# Patient Record
Sex: MALE | Race: BLACK OR AFRICAN AMERICAN | NOT HISPANIC OR LATINO | Employment: FULL TIME | URBAN - METROPOLITAN AREA
[De-identification: names, ages, dates, MRNs, and addresses within clinical notes are randomized per-mention and may not be internally consistent; named-entity substitution may affect disease eponyms.]

---

## 2023-11-08 ENCOUNTER — APPOINTMENT (OUTPATIENT)
Dept: RADIOLOGY | Facility: HOSPITAL | Age: 53
End: 2023-11-08
Payer: COMMERCIAL

## 2023-11-08 ENCOUNTER — HOSPITAL ENCOUNTER (EMERGENCY)
Facility: HOSPITAL | Age: 53
Discharge: HOME | End: 2023-11-08
Attending: STUDENT IN AN ORGANIZED HEALTH CARE EDUCATION/TRAINING PROGRAM
Payer: COMMERCIAL

## 2023-11-08 VITALS
HEART RATE: 75 BPM | RESPIRATION RATE: 18 BRPM | OXYGEN SATURATION: 97 % | DIASTOLIC BLOOD PRESSURE: 80 MMHG | WEIGHT: 230 LBS | SYSTOLIC BLOOD PRESSURE: 120 MMHG | BODY MASS INDEX: 29.52 KG/M2 | TEMPERATURE: 97.9 F | HEIGHT: 74 IN

## 2023-11-08 DIAGNOSIS — R07.9 CHEST PAIN, UNSPECIFIED TYPE: Primary | ICD-10-CM

## 2023-11-08 LAB
ALBUMIN SERPL BCP-MCNC: 4.2 G/DL (ref 3.4–5)
ALP SERPL-CCNC: 35 U/L (ref 33–120)
ALT SERPL W P-5'-P-CCNC: 20 U/L (ref 10–52)
ANION GAP SERPL CALC-SCNC: 8 MMOL/L (ref 10–20)
AST SERPL W P-5'-P-CCNC: 18 U/L (ref 9–39)
BASOPHILS # BLD AUTO: 0.02 X10*3/UL (ref 0–0.1)
BASOPHILS NFR BLD AUTO: 0.7 %
BILIRUB SERPL-MCNC: 0.7 MG/DL (ref 0–1.2)
BUN SERPL-MCNC: 15 MG/DL (ref 6–23)
CALCIUM SERPL-MCNC: 9.4 MG/DL (ref 8.6–10.3)
CARDIAC TROPONIN I PNL SERPL HS: 3 NG/L (ref 0–20)
CARDIAC TROPONIN I PNL SERPL HS: 3 NG/L (ref 0–20)
CHLORIDE SERPL-SCNC: 104 MMOL/L (ref 98–107)
CO2 SERPL-SCNC: 33 MMOL/L (ref 21–32)
CREAT SERPL-MCNC: 0.97 MG/DL (ref 0.5–1.3)
EOSINOPHIL # BLD AUTO: 0.06 X10*3/UL (ref 0–0.7)
EOSINOPHIL NFR BLD AUTO: 2 %
ERYTHROCYTE [DISTWIDTH] IN BLOOD BY AUTOMATED COUNT: 13.1 % (ref 11.5–14.5)
GFR SERPL CREATININE-BSD FRML MDRD: >90 ML/MIN/1.73M*2
GLUCOSE SERPL-MCNC: 110 MG/DL (ref 74–99)
HCT VFR BLD AUTO: 37.3 % (ref 41–52)
HGB BLD-MCNC: 12.6 G/DL (ref 13.5–17.5)
IMM GRANULOCYTES # BLD AUTO: 0.01 X10*3/UL (ref 0–0.7)
IMM GRANULOCYTES NFR BLD AUTO: 0.3 % (ref 0–0.9)
LYMPHOCYTES # BLD AUTO: 0.78 X10*3/UL (ref 1.2–4.8)
LYMPHOCYTES NFR BLD AUTO: 26.2 %
MAGNESIUM SERPL-MCNC: 1.99 MG/DL (ref 1.6–2.4)
MCH RBC QN AUTO: 27.1 PG (ref 26–34)
MCHC RBC AUTO-ENTMCNC: 33.8 G/DL (ref 32–36)
MCV RBC AUTO: 80 FL (ref 80–100)
MONOCYTES # BLD AUTO: 0.18 X10*3/UL (ref 0.1–1)
MONOCYTES NFR BLD AUTO: 6 %
NEUTROPHILS # BLD AUTO: 1.93 X10*3/UL (ref 1.2–7.7)
NEUTROPHILS NFR BLD AUTO: 64.8 %
NRBC BLD-RTO: 0 /100 WBCS (ref 0–0)
PLATELET # BLD AUTO: 140 X10*3/UL (ref 150–450)
POTASSIUM SERPL-SCNC: 3.9 MMOL/L (ref 3.5–5.3)
PROT SERPL-MCNC: 6.3 G/DL (ref 6.4–8.2)
RBC # BLD AUTO: 4.65 X10*6/UL (ref 4.5–5.9)
SODIUM SERPL-SCNC: 141 MMOL/L (ref 136–145)
WBC # BLD AUTO: 3 X10*3/UL (ref 4.4–11.3)

## 2023-11-08 PROCEDURE — 85025 COMPLETE CBC W/AUTO DIFF WBC: CPT

## 2023-11-08 PROCEDURE — 71045 X-RAY EXAM CHEST 1 VIEW: CPT | Mod: FY

## 2023-11-08 PROCEDURE — 84484 ASSAY OF TROPONIN QUANT: CPT

## 2023-11-08 PROCEDURE — 93010 ELECTROCARDIOGRAM REPORT: CPT | Performed by: STUDENT IN AN ORGANIZED HEALTH CARE EDUCATION/TRAINING PROGRAM

## 2023-11-08 PROCEDURE — 99285 EMERGENCY DEPT VISIT HI MDM: CPT | Performed by: STUDENT IN AN ORGANIZED HEALTH CARE EDUCATION/TRAINING PROGRAM

## 2023-11-08 PROCEDURE — 36415 COLL VENOUS BLD VENIPUNCTURE: CPT

## 2023-11-08 PROCEDURE — 99285 EMERGENCY DEPT VISIT HI MDM: CPT | Mod: 25 | Performed by: STUDENT IN AN ORGANIZED HEALTH CARE EDUCATION/TRAINING PROGRAM

## 2023-11-08 PROCEDURE — 83735 ASSAY OF MAGNESIUM: CPT

## 2023-11-08 PROCEDURE — 80053 COMPREHEN METABOLIC PANEL: CPT

## 2023-11-08 PROCEDURE — 71045 X-RAY EXAM CHEST 1 VIEW: CPT | Performed by: RADIOLOGY

## 2023-11-08 PROCEDURE — 99283 EMERGENCY DEPT VISIT LOW MDM: CPT | Mod: 25

## 2023-11-08 ASSESSMENT — PAIN DESCRIPTION - LOCATION: LOCATION: CHEST

## 2023-11-08 ASSESSMENT — HEART SCORE
AGE: 45-64
RISK FACTORS: 1-2 RISK FACTORS
HEART SCORE: 2
TROPONIN: LESS THAN OR EQUAL TO NORMAL LIMIT
ECG: NORMAL
HISTORY: SLIGHTLY SUSPICIOUS

## 2023-11-08 ASSESSMENT — COLUMBIA-SUICIDE SEVERITY RATING SCALE - C-SSRS
2. HAVE YOU ACTUALLY HAD ANY THOUGHTS OF KILLING YOURSELF?: NO
6. HAVE YOU EVER DONE ANYTHING, STARTED TO DO ANYTHING, OR PREPARED TO DO ANYTHING TO END YOUR LIFE?: NO
1. IN THE PAST MONTH, HAVE YOU WISHED YOU WERE DEAD OR WISHED YOU COULD GO TO SLEEP AND NOT WAKE UP?: NO

## 2023-11-08 ASSESSMENT — LIFESTYLE VARIABLES
EVER HAD A DRINK FIRST THING IN THE MORNING TO STEADY YOUR NERVES TO GET RID OF A HANGOVER: NO
HAVE YOU EVER FELT YOU SHOULD CUT DOWN ON YOUR DRINKING: NO
REASON UNABLE TO ASSESS: NO
EVER FELT BAD OR GUILTY ABOUT YOUR DRINKING: NO
HAVE PEOPLE ANNOYED YOU BY CRITICIZING YOUR DRINKING: NO

## 2023-11-08 ASSESSMENT — PAIN SCALES - GENERAL: PAINLEVEL_OUTOF10: 2

## 2023-11-08 ASSESSMENT — PAIN - FUNCTIONAL ASSESSMENT: PAIN_FUNCTIONAL_ASSESSMENT: 0-10

## 2023-11-08 NOTE — DISCHARGE INSTRUCTIONS
Please follow up with your primary care physician within 1-2 days.  Please call the cardiologist for an appointment.  If you have worsening chest pain, difficulty breathing, or other concerning symptoms, please seek immediate medical attention and come back into the ER.    If you have a return or worsening of symptoms, please seek immediate medical attention.

## 2023-11-08 NOTE — ED PROVIDER NOTES
EMERGENCY DEPARTMENT ENCOUNTER      Pt Name: Ankush Bauer  MRN: [id number]  Birthdate 1970  Date of evaluation: 11/8/2023  Provider: Cony Clemens DO    CHIEF COMPLAINT       Chief Complaint   Patient presents with    Chest Pain     Patient states chest pain started approx 2 am         HISTORY OF PRESENT ILLNESS    Patient is a 53-year-old male with a past medical history of prostate cancer who presents to the ED with chief complaint of chest pain.  Patient reports that he has had the pain for few years in the past but that it came on this time around 2 AM this morning.  Describes as a spasm-light pain that is localized under his left breast.  States that does not radiate anywhere.  Pain is usually episodic and occurs every 15 minutes for about 2-3 seconds.  Mostly occurs at rest.  He had an argument with his son yesterday before the pain started. He notes when he had the pain in the past, it was during his divorce. Rates the pain a 2/10 on the pain scale.  Patient has tried aspirin but does not seem to get any relief.  Denies any associated symptoms.  No headaches, vision changes, fevers, chills, shortness of breath, abdominal pain, nausea, vomiting, diarrhea or swelling in his legs and ankles.          Nursing Notes were reviewed.    PAST MEDICAL HISTORY     Past Medical History:   Diagnosis Date    Encounter for immunization 02/12/2021    Encounter for immunization    Encounter for immunization     Encounter for immunization    Encounter for screening for malignant neoplasm of colon 05/13/2021    Colon cancer screening    Encounter for screening for malignant neoplasm of prostate 11/30/2020    Screening for prostate cancer    Personal history of other diseases of the respiratory system 07/28/2015    History of hay fever    Personal history of other specified conditions 01/14/2021    History of elevated prostate specific antigen (PSA)         SURGICAL HISTORY       Past Surgical History:   Procedure  Laterality Date    OTHER SURGICAL HISTORY  12/21/2020    No history of surgery         CURRENT MEDICATIONS       There are no discharge medications for this patient.      ALLERGIES     Patient has no known allergies.    FAMILY HISTORY     No family history on file.       SOCIAL HISTORY       Social History     Socioeconomic History    Marital status:      Spouse name: None    Number of children: None    Years of education: None    Highest education level: None   Occupational History    None   Tobacco Use    Smoking status: None    Smokeless tobacco: None   Substance and Sexual Activity    Alcohol use: None    Drug use: None    Sexual activity: None   Other Topics Concern    None   Social History Narrative    None     Social Determinants of Health     Financial Resource Strain: Not on file   Food Insecurity: Not on file   Transportation Needs: Not on file   Physical Activity: Not on file   Stress: Not on file   Social Connections: Not on file   Intimate Partner Violence: Not on file   Housing Stability: Not on file       SCREENINGS                        PHYSICAL EXAM    (up to 7 for level 4, 8 or more for level 5)     ED Triage Vitals [11/08/23 0904]   Temp Heart Rate Resp BP   36.8 °C (98.2 °F) 79 18 146/72      SpO2 Temp Source Heart Rate Source Patient Position   98 % Temporal Monitor Sitting      BP Location FiO2 (%)     Right arm --       REVIEW OF SYSTEMS:    CONSTITUTIONAL: Denies fever, sweats, chills.   NEURO: Denies headache.   HEENT: Denies changes in vision.   CARDIO: Reports chest pain Denies palpitations.  PULM: Denies shortness of breath, cough.   GI: Denies abdominal pain, nausea, vomiting        Physical Exam  Vitals and nursing note reviewed.   Constitutional:       General: He is not in acute distress.     Appearance: He is well-developed. He is not ill-appearing, toxic-appearing or diaphoretic.   HENT:      Head: Normocephalic.   Cardiovascular:      Rate and Rhythm: Normal rate and  regular rhythm.      Pulses:           Carotid pulses are 2+ on the right side and 2+ on the left side.       Radial pulses are 2+ on the right side and 2+ on the left side.        Dorsalis pedis pulses are 2+ on the right side and 2+ on the left side.        Posterior tibial pulses are 2+ on the right side and 2+ on the left side.      Heart sounds: Normal heart sounds. No murmur heard.     No gallop.   Pulmonary:      Effort: Pulmonary effort is normal.      Breath sounds: Normal breath sounds. No wheezing, rhonchi or rales.   Chest:      Chest wall: No tenderness.   Abdominal:      General: Bowel sounds are normal.      Palpations: Abdomen is soft.      Tenderness: There is no abdominal tenderness. There is no guarding or rebound.   Musculoskeletal:      Cervical back: Normal range of motion.      Right lower leg: No edema.      Left lower leg: No edema.   Skin:     General: Skin is warm and dry.      Capillary Refill: Capillary refill takes less than 2 seconds.   Neurological:      General: No focal deficit present.      Mental Status: He is alert and oriented to person, place, and time.   Psychiatric:         Mood and Affect: Mood normal.         Behavior: Behavior normal.          DIAGNOSTIC RESULTS     LABS:  Labs Reviewed   CBC WITH AUTO DIFFERENTIAL - Abnormal       Result Value    WBC 3.0 (*)     nRBC 0.0      RBC 4.65      Hemoglobin 12.6 (*)     Hematocrit 37.3 (*)     MCV 80      MCH 27.1      MCHC 33.8      RDW 13.1      Platelets 140 (*)     Neutrophils % 64.8      Immature Granulocytes %, Automated 0.3      Lymphocytes % 26.2      Monocytes % 6.0      Eosinophils % 2.0      Basophils % 0.7      Neutrophils Absolute 1.93      Immature Granulocytes Absolute, Automated 0.01      Lymphocytes Absolute 0.78 (*)     Monocytes Absolute 0.18      Eosinophils Absolute 0.06      Basophils Absolute 0.02     COMPREHENSIVE METABOLIC PANEL - Abnormal    Glucose 110 (*)     Sodium 141      Potassium 3.9       Chloride 104      Bicarbonate 33 (*)     Anion Gap 8 (*)     Urea Nitrogen 15      Creatinine 0.97      eGFR >90      Calcium 9.4      Albumin 4.2      Alkaline Phosphatase 35      Total Protein 6.3 (*)     AST 18      Bilirubin, Total 0.7      ALT 20     MAGNESIUM - Normal    Magnesium 1.99     SERIAL TROPONIN-INITIAL - Normal    Troponin I, High Sensitivity 3      Narrative:     Less than 99th percentile of normal range cutoff-  Female and children under 18 years old <14 ng/L; Male <21 ng/L: Negative  Repeat testing should be performed if clinically indicated.     Female and children under 18 years old 14-50 ng/L; Male 21-50 ng/L:  Consistent with possible cardiac damage and possible increased clinical   risk. Serial measurements may help to assess extent of myocardial damage.     >50 ng/L: Consistent with cardiac damage, increased clinical risk and  myocardial infarction. Serial measurements may help assess extent of   myocardial damage.      NOTE: Children less than 1 year old may have higher baseline troponin   levels and results should be interpreted in conjunction with the overall   clinical context.     NOTE: Troponin I testing is performed using a different   testing methodology at [facility] than at other   [facility]. Direct result comparisons should only   be made within the same method.   SERIAL TROPONIN, 1 HOUR - Normal    Troponin I, High Sensitivity 3      Narrative:     Less than 99th percentile of normal range cutoff-  Female and children under 18 years old <14 ng/L; Male <21 ng/L: Negative  Repeat testing should be performed if clinically indicated.     Female and children under 18 years old 14-50 ng/L; Male 21-50 ng/L:  Consistent with possible cardiac damage and possible increased clinical   risk. Serial measurements may help to assess extent of myocardial damage.     >50 ng/L: Consistent with cardiac damage, increased clinical risk and  myocardial infarction. Serial  measurements may help assess extent of   myocardial damage.      NOTE: Children less than 1 year old may have higher baseline troponin   levels and results should be interpreted in conjunction with the overall   clinical context.     NOTE: Troponin I testing is performed using a different   testing methodology at [facility] than at other   [facility]. Direct result comparisons should only   be made within the same method.   TROPONIN SERIES- (INITIAL, 1 HR)    Narrative:     The following orders were created for panel order Troponin I Series, High Sensitivity (0, 1 HR).  Procedure                               Abnormality         Status                     ---------                               -----------         ------                     Troponin I, High Sensiti...[[id number]]  Normal              Final result               Troponin, High Sensitivi...[[id number]]  Normal              Final result                 Please view results for these tests on the individual orders.       All other labs were within normal range or not returned as of this dictation.    Imaging  XR chest 1 view   Final Result   No active cardiopulmonary disease.                  MACRO:   None        Signed by: Priya Willard 11/8/2023 10:47 AM   Dictation workstation:   [id number]           Procedures  Procedures     EMERGENCY DEPARTMENT COURSE/MDM:     ED Course as of 11/08/23 2256   Wed Nov 08, 2023   0913 EKG independently interpreted by attending physician    0901 hrs.: Normal sinus rhythm ventricular to 73 bpm.  QTc 394.  .  Left axis deviation.  No acute injury pattern seen. [AI]   1156 EKG independently interpreted by attending physician    1123 hrs.: Normal sinus rhythm ventricular to 70 bpm.  QTc 395.  .  No acute injury pattern seen. [AI]   1244 KG independently interpreted by attending physician    1223 hrs.: Normal sinus rhythm ventricular rate of 68 bpm.  QTc 397.  .  No acute injury  pattern seen. [AI]      ED Course User Index  [AI] Cony Clemens DO         Diagnoses as of 11/08/23 2256   Chest pain, unspecified type        Medical Decision Making  Patient is a 53-year-old male with a past medical history of prostate cancer who presents to the ED with chief complaint of chest pain. Heart score of 2. We still proceeded with a cardiac work-up.      Nontoxic-appearing male, no acute distress.  No abnormalities noted on patient's labs or imaging.  EKG showed no acute ischemic changes.    Patient did that this chest pain episode exacerbated by an argument with his son.  States he had previous chest pains like this while going through a divorce years ago.     Patient was discharged with instruction to follow up with cardiology and PCP in 1-2 days. Patient instructed to return to the ED if his symptoms worsen or he experiences difficulty breathing or other concerning symptoms.       Patient in agreement and understanding of treatment plan.  All questions answered.      I reviewed the case with the attending ED physician. The attending ED physician agrees with the plan. Patient and/or patient´s representative was counseled regarding labs, imaging, likely diagnosis, and plan. All questions were answered.       Blas Sheridan MD  Resident  11/08/23 1309    ED Medications administered this visit:  Medications - No data to display    New Prescriptions from this visit:    There are no discharge medications for this patient.      Follow-up:  Dc Mcclain DO  [address]  [facility]  [facility] [id number] [phone]          Ezequiel Umana MD  [address]  [facility] [address]  [phone]              Final Impression:   1. Chest pain, unspecified type          (Please note that portions of this note were completed with a voice recognition program.  Efforts were made to edit the dictations but occasionally words are  mis-transcribed.)  ----------------------  Attending note    53-year-old male presents with complaint of chest pain.  States that he began to have intermittent chest pain underneath his left breast that started around 0200 hrs. this morning.  He notes that he did have an argument with his son prior.  Denies any fevers, chills, night sweats, shortness of breath, abdominal pain, nausea/vomiting.  He states that he has had chest pain like this before in the past when he was going through his divorce.  At that time, years ago, he had a stress test that he states was negative.  Has not had a cardiac catheterization.  Does not follow with a cardiologist.  States that he took 2 aspirin and normally takes 1.  States that pain is a 2/10 on pain scale.    Nontoxic-appearing male, no acute distress.  Cardiac work-up initiated.  No acute injury pattern on EKG.  Did offer the patient 2 more of aspirin to make it a full dose however he notes that he would like to take his aspirin at home and feels comfortable without it at this time.  Did have slight leukopenia however in reviewing EMR, he has had this in the past. Troponins are negative x 2. No acute injury pattern on EKGs. Patient will be discharged home to follow up with PCP and cardiology. Patient hemodynamically stable. Updated about results. All questions and concerns addressed. Patient discharged in stable condition.        The patient was seen by the resident/fellow.  I have personally performed a substantive portion of the encounter.  I have seen and examined the patient; agree with the workup, evaluation, MDM, management and diagnosis.  The care plan has been discussed with the resident; I have reviewed the resident’s note and agree with the documented findings.           Cony Clemens,   11/08/23 7192

## 2023-11-09 ENCOUNTER — HOSPITAL ENCOUNTER (OUTPATIENT)
Dept: CARDIOLOGY | Facility: HOSPITAL | Age: 53
Discharge: HOME | End: 2023-11-09
Payer: COMMERCIAL

## 2023-11-09 LAB
ATRIAL RATE: 68 BPM
ATRIAL RATE: 70 BPM
ATRIAL RATE: 73 BPM
P AXIS: 42 DEGREES
P AXIS: 52 DEGREES
P AXIS: 72 DEGREES
P OFFSET: 170 MS
P OFFSET: 172 MS
P OFFSET: 173 MS
P ONSET: 108 MS
P ONSET: 110 MS
P ONSET: 116 MS
PR INTERVAL: 180 MS
PR INTERVAL: 190 MS
PR INTERVAL: 192 MS
Q ONSET: 204 MS
Q ONSET: 205 MS
Q ONSET: 206 MS
QRS COUNT: 11 BEATS
QRS COUNT: 12 BEATS
QRS COUNT: 12 BEATS
QRS DURATION: 120 MS
QT INTERVAL: 358 MS
QT INTERVAL: 366 MS
QT INTERVAL: 374 MS
QTC CALCULATION(BAZETT): 394 MS
QTC CALCULATION(BAZETT): 395 MS
QTC CALCULATION(BAZETT): 397 MS
QTC FREDERICIA: 382 MS
QTC FREDERICIA: 385 MS
QTC FREDERICIA: 390 MS
R AXIS: -28 DEGREES
R AXIS: -34 DEGREES
R AXIS: -46 DEGREES
T AXIS: 47 DEGREES
T AXIS: 50 DEGREES
T AXIS: 71 DEGREES
T OFFSET: 385 MS
T OFFSET: 388 MS
T OFFSET: 391 MS
VENTRICULAR RATE: 68 BPM
VENTRICULAR RATE: 70 BPM
VENTRICULAR RATE: 73 BPM

## 2023-11-09 PROCEDURE — 93005 ELECTROCARDIOGRAM TRACING: CPT

## 2024-12-02 ENCOUNTER — TELEPHONE (OUTPATIENT)
Dept: UROLOGY | Facility: CLINIC | Age: 54
End: 2024-12-02
Payer: COMMERCIAL

## 2024-12-03 ENCOUNTER — LAB (OUTPATIENT)
Dept: LAB | Facility: LAB | Age: 54
End: 2024-12-03
Payer: COMMERCIAL

## 2024-12-03 DIAGNOSIS — R31.9 HEMATURIA, UNSPECIFIED TYPE: ICD-10-CM

## 2024-12-03 DIAGNOSIS — C61 MALIGNANT NEOPLASM OF PROSTATE (MULTI): ICD-10-CM

## 2024-12-03 LAB — PSA SERPL-MCNC: 11.04 NG/ML

## 2024-12-03 PROCEDURE — 36415 COLL VENOUS BLD VENIPUNCTURE: CPT

## 2024-12-03 PROCEDURE — 82565 ASSAY OF CREATININE: CPT

## 2024-12-03 PROCEDURE — 84153 ASSAY OF PSA TOTAL: CPT

## 2024-12-04 ENCOUNTER — APPOINTMENT (OUTPATIENT)
Dept: UROLOGY | Facility: CLINIC | Age: 54
End: 2024-12-04
Payer: COMMERCIAL

## 2024-12-04 VITALS
SYSTOLIC BLOOD PRESSURE: 147 MMHG | TEMPERATURE: 97.4 F | HEART RATE: 71 BPM | DIASTOLIC BLOOD PRESSURE: 82 MMHG | WEIGHT: 253 LBS | BODY MASS INDEX: 32.48 KG/M2

## 2024-12-04 DIAGNOSIS — R31.9 HEMATURIA, UNSPECIFIED TYPE: ICD-10-CM

## 2024-12-04 DIAGNOSIS — C61 MALIGNANT NEOPLASM OF PROSTATE (MULTI): ICD-10-CM

## 2024-12-04 LAB
CREAT SERPL-MCNC: 1.11 MG/DL (ref 0.5–1.3)
EGFRCR SERPLBLD CKD-EPI 2021: 79 ML/MIN/1.73M*2

## 2024-12-04 PROCEDURE — G2211 COMPLEX E/M VISIT ADD ON: HCPCS | Performed by: UROLOGY

## 2024-12-04 PROCEDURE — 99214 OFFICE O/P EST MOD 30 MIN: CPT | Performed by: UROLOGY

## 2024-12-04 ASSESSMENT — ENCOUNTER SYMPTOMS
LOSS OF SENSATION IN FEET: 0
DEPRESSION: 0
OCCASIONAL FEELINGS OF UNSTEADINESS: 0

## 2024-12-04 ASSESSMENT — PATIENT HEALTH QUESTIONNAIRE - PHQ9
2. FEELING DOWN, DEPRESSED OR HOPELESS: NOT AT ALL
SUM OF ALL RESPONSES TO PHQ9 QUESTIONS 1 AND 2: 0
1. LITTLE INTEREST OR PLEASURE IN DOING THINGS: NOT AT ALL

## 2024-12-04 NOTE — PROGRESS NOTES
Subjective   Patient ID: Ankush Bauer is a 54 y.o. male who presents for Follow-up (1 year; prostate cancer). Last seen 6/15/23 when Patient will remain on active surveillance. He will have PSAs on 3-month intervals and we will plan to repeat a biopsy next year. We refilled his sildenafil prescription today. He was instructed on proper dosing and potential side effects.      HPI  The patient relates that his flow and frequency is normal but he has an occasional feeling of being restricted. Nocturia x1. About 1 month ago he had some hematuria that last a few hours. It resolved spontaneously. He did not follow-up in the ER or otherwise.     PSA Results  Component  Ref Range & Units 1 d ago  (12/3/24) 3 yr ago  (7/6/21) 3 yr ago  (12/8/20) 5 yr ago  (3/14/19)   Prostate Specific AG  <=4.00 ng/mL 11.04 High  5.20 High  R, CM 5.7 High  R, CM 3.9 R, CM     Review of Systems  A 12 system review was completed and is negative with the exception of those signs and symptoms noted in the history of present illness.    Objective   Physical Exam  General: in NAD, appears stated age  Head: normocephalic, atraumatic  Respiratory: normal effort, no use of accessory muscles  Cardiovascular: no edema noted  Skin: normal turgor, no rashes  Neurologic: grossly intact, oriented to person/place/time  Psychiatric: mode and affect appropriate     Assessment/Plan   Problem List Items Addressed This Visit    None  Visit Diagnoses         Codes    Malignant neoplasm of prostate (Multi)     C61    Relevant Orders    Prostate Specific Antigen (Completed)    MR prostate with mirta boundaries if pirads 3 or above    Hematuria, unspecified type     R31.9    Relevant Orders    Creatinine    CT urography w 3D volume rendered imaging          PSA is up to 11.04. His prior PSA in our records was 5.20 in 7/2021. We discussed the [facility] work-up protocol for hematuria. Order MRI prostate and CT Urogram.  We will wait to schedule cystoscopy as the patient  may require a biopsy and we will do the cystoscopy at that same time. Follow-up with imaging results.    Scribe Attestation  By signing my name below, I, Kai Bowman   attest that this documentation has been prepared under the direction and in the presence of Casey Schneider MD.

## 2025-01-08 ENCOUNTER — HOSPITAL ENCOUNTER (OUTPATIENT)
Dept: RADIOLOGY | Facility: CLINIC | Age: 55
Discharge: HOME | End: 2025-01-08
Payer: COMMERCIAL

## 2025-01-08 DIAGNOSIS — R31.9 HEMATURIA, UNSPECIFIED TYPE: ICD-10-CM

## 2025-01-08 PROCEDURE — 76377 3D RENDER W/INTRP POSTPROCES: CPT | Performed by: RADIOLOGY

## 2025-01-08 PROCEDURE — 2550000001 HC RX 255 CONTRASTS: Performed by: UROLOGY

## 2025-01-08 PROCEDURE — 74178 CT ABD&PLV WO CNTR FLWD CNTR: CPT | Performed by: RADIOLOGY

## 2025-01-08 PROCEDURE — 74178 CT ABD&PLV WO CNTR FLWD CNTR: CPT

## 2025-01-08 RX ADMIN — IOHEXOL 90 ML: 350 INJECTION, SOLUTION INTRAVENOUS at 12:13

## 2025-01-09 ENCOUNTER — HOSPITAL ENCOUNTER (OUTPATIENT)
Dept: RADIOLOGY | Facility: CLINIC | Age: 55
Discharge: HOME | End: 2025-01-09
Payer: COMMERCIAL

## 2025-01-09 DIAGNOSIS — C61 MALIGNANT NEOPLASM OF PROSTATE (MULTI): ICD-10-CM

## 2025-01-09 PROCEDURE — A9575 INJ GADOTERATE MEGLUMI 0.1ML: HCPCS | Performed by: UROLOGY

## 2025-01-09 PROCEDURE — 72197 MRI PELVIS W/O & W/DYE: CPT

## 2025-01-09 PROCEDURE — 76498 UNLISTED MR PROCEDURE: CPT

## 2025-01-09 PROCEDURE — 2550000001 HC RX 255 CONTRASTS: Performed by: UROLOGY

## 2025-01-09 RX ORDER — GADOTERATE MEGLUMINE 376.9 MG/ML
20 INJECTION INTRAVENOUS
Status: COMPLETED | OUTPATIENT
Start: 2025-01-09 | End: 2025-01-09

## 2025-01-09 RX ADMIN — GADOTERATE MEGLUMINE 20 ML: 376.9 INJECTION INTRAVENOUS at 11:57

## 2025-01-15 RX ORDER — LIDOCAINE HYDROCHLORIDE 20 MG/ML
1 JELLY TOPICAL ONCE
Status: COMPLETED | OUTPATIENT
Start: 2025-01-16 | End: 2025-01-16

## 2025-01-16 ENCOUNTER — PROCEDURE VISIT (OUTPATIENT)
Dept: UROLOGY | Facility: CLINIC | Age: 55
End: 2025-01-16
Payer: COMMERCIAL

## 2025-01-16 ENCOUNTER — HOSPITAL ENCOUNTER (OUTPATIENT)
Facility: HOSPITAL | Age: 55
Setting detail: OUTPATIENT SURGERY
End: 2025-01-16
Attending: UROLOGY | Admitting: UROLOGY
Payer: COMMERCIAL

## 2025-01-16 VITALS — DIASTOLIC BLOOD PRESSURE: 96 MMHG | SYSTOLIC BLOOD PRESSURE: 145 MMHG | HEART RATE: 98 BPM | TEMPERATURE: 97.4 F

## 2025-01-16 DIAGNOSIS — R31.9 HEMATURIA, UNSPECIFIED TYPE: Primary | ICD-10-CM

## 2025-01-16 DIAGNOSIS — C61 PROSTATE CANCER (MULTI): ICD-10-CM

## 2025-01-16 LAB
POC APPEARANCE, URINE: CLEAR
POC BILIRUBIN, URINE: NEGATIVE
POC BLOOD, URINE: ABNORMAL
POC COLOR, URINE: YELLOW
POC GLUCOSE, URINE: NEGATIVE MG/DL
POC KETONES, URINE: NEGATIVE MG/DL
POC LEUKOCYTES, URINE: NEGATIVE
POC NITRITE,URINE: NEGATIVE
POC PH, URINE: 5.5 PH
POC PROTEIN, URINE: NEGATIVE MG/DL
POC SPECIFIC GRAVITY, URINE: 1.02
POC UROBILINOGEN, URINE: 1 EU/DL

## 2025-01-16 PROCEDURE — 52000 CYSTOURETHROSCOPY: CPT | Performed by: UROLOGY

## 2025-01-16 PROCEDURE — 81003 URINALYSIS AUTO W/O SCOPE: CPT | Performed by: UROLOGY

## 2025-01-16 PROCEDURE — 99214 OFFICE O/P EST MOD 30 MIN: CPT | Performed by: UROLOGY

## 2025-01-16 RX ADMIN — LIDOCAINE HYDROCHLORIDE 1 APPLICATION: 20 JELLY TOPICAL at 08:19

## 2025-01-16 ASSESSMENT — ENCOUNTER SYMPTOMS
OCCASIONAL FEELINGS OF UNSTEADINESS: 0
LOSS OF SENSATION IN FEET: 0
DEPRESSION: 0

## 2025-01-16 ASSESSMENT — PATIENT HEALTH QUESTIONNAIRE - PHQ9
SUM OF ALL RESPONSES TO PHQ9 QUESTIONS 1 AND 2: 0
1. LITTLE INTEREST OR PLEASURE IN DOING THINGS: NOT AT ALL
2. FEELING DOWN, DEPRESSED OR HOPELESS: NOT AT ALL

## 2025-01-16 NOTE — PROGRESS NOTES
"Subjective   Patient ID: Ankush Bauer is a 54 y.o. male who presents for cystoscopy. Last seen 12/4/24 when PSA is up to 11.04. His prior PSA in our records was 5.20 in 7/2021. [facility] for [facility]. Order MRI prostate and CT Urogram.  We will wait to schedule cystoscopy as the patient may require a biopsy and we will do the cystoscopy at that same time. Follow-up with imaging results.     HPI  The patient states he is an irregular smoker who would smoke a lot then quit repeatedly, up until 10 years ago.     The patient states hematuria started 1 day before he went for the CT scan and it was \"beet red\" and there were pieces of tissue discharged also. Later he passed a larger piece of tissue or clot.     MRI Prostate Results  PROSTATE VOLUME:  The prostate measures 6.6 x 5.9 x 7.3 cm in right-to-left,  anterior-posterior and craniocaudal dimension.  Prostate weight is estimated at 148.84g. PSA density is 0.07 ng/mL/g.  IMPRESSION:  1. A PI-RADS 5 lesion in the left posterior paramedian peripheral  zone at the midgland. Broad abutment without macroscopic  extracapsular extension.  2. Interval development of an intraluminal diffusion restricting  lesion/collection in the right aspect of the bladder which  demonstrates non enhancement. Findings may be secondary to a  hemorrhagic collection however an underlying neoplasm can not be  excluded. Recommend cystoscopy for further evaluation.    CT Urogram results  IMPRESSION:  PROSTATOMEGALY      NO OTHER CONTRIBUTORY ABNORMALITIES IN THE URINARY TRACT AND NONE AT  ALL IN THE UPPER TRACT, WITHIN THE LIMITATIONS OF THIS EXAM      NO STONE ANYWHERE IN THE URINARY TRACT      NO HYDROURETERONEPHROSIS ON EITHER SIDE      NO EVIDENCE OF ACUTE INFLAMMATION ANYWHERE IN THE URINARY TRACT      NO SOLID RENAL PARENCHYMAL MASS      NO EVIDENCE OF UROTHELIAL LESION IN THE OPACIFIED URINARY TRACT,  NOTING THAT THE FOLLOWING WAS / WERE NOT WELL ENOUGH " OPACIFIED WITH  EXCRETED CONTRAST DURING THE EXCRETORY PHASE OF TODAY'S EXAM TO  EVALUATE DIRECTLY: THE NONDEPENDENT TWO THIRDS OF THE URINARY  BLADDER; THE DISTAL 2/3 OF THE RIGHT URETER; MOST OF THE DISTAL 2/3  OF THE LEFT URETER.  OF THE PORTIONS OF THE URETERS NOT WELL ENOUGH  OPACIFIED TO EVALUATE DIRECTLY, NONE WAS PARTICULARLY EXPANSILE TO  SUGGEST AND UNDERLYING SUBSTANTIAL SIZED OCCULT UROTHELIAL LESION      NO VARIANT ANATOMY SUCH AS URACHAL REMNANT OR DUPLICATED/ECTOPIC  URETERS      NO ACUTE UNANTICIPATED PROCESS IN THE ABDOMEN OR PELVIS OUTSIDE THE  URINARY TRACT        Review of Systems  A 12 system review was completed and is negative with the exception of those signs and symptoms noted in the history of present illness.    Objective   Physical Exam  General: in NAD, appears stated age  Head: normocephalic, atraumatic  Respiratory: normal effort, no use of accessory muscles  Cardiovascular: no edema noted  Skin: normal turgor, no rashes  Neurologic: grossly intact, oriented to person/place/time  Psychiatric: mode and affect appropriate     Procedure  Cystoscopy for hematuria  Patient's genitalia were prepped and draped in the usual sterile fashion. A 17 [place] flexible cystoscope was passed atraumatically per the urethra. The anterior and posterior urethra were normal. The prostatic urethra was unremarkable. The bladder was inspected. Findings of enlarged prostate and bladder trabeculation, no tumors. Urine was blood tinged so visualization was sub optimal. No tumors, clots, stones, or foreign bodies were identified. The right and left ureteral orifice were in their normal anatomic position and were effluxing clear urine. The scope was retroflexed and the bladder neck was unremarkable. The cystoscope was removed and the patient tolerated the procedure well.     Assessment/Plan     The patient tolerated the cystoscopy procedure well. Cystoscopy finds enlargement of the prostate and bladder  trabeculation, no tumors. Urine was blood tinged so visualization was sub optimal.     We discussed the MRI results. PSA is going up and the lesion is growing. There is also a finding of something in the bladder. Prostate weight is estimated at 148.84 g PI-RADS 5 lesion. PI-RADS 5 lesions are cancer about 90% of the time. I explained the biopsy procedure to the patient. I also explained what to expect after the procedure. We will schedule cystoscopy and fusion biopsy at [facility] on January 26 and follow-up with pathology results.     Scribe Attestation  By signing my name below, I, Caridad Live , Kai   attest that this documentation has been prepared under the direction and in the presence of Casey Schneider MD.

## 2025-01-31 ENCOUNTER — APPOINTMENT (OUTPATIENT)
Dept: PRIMARY CARE | Facility: CLINIC | Age: 55
End: 2025-01-31
Payer: COMMERCIAL

## 2025-01-31 VITALS
RESPIRATION RATE: 16 BRPM | TEMPERATURE: 97.9 F | BODY MASS INDEX: 31.06 KG/M2 | HEIGHT: 74 IN | SYSTOLIC BLOOD PRESSURE: 132 MMHG | OXYGEN SATURATION: 96 % | WEIGHT: 242 LBS | HEART RATE: 76 BPM | DIASTOLIC BLOOD PRESSURE: 82 MMHG

## 2025-01-31 DIAGNOSIS — R00.2 PALPITATIONS: ICD-10-CM

## 2025-01-31 DIAGNOSIS — Z00.00 HEALTHCARE MAINTENANCE: Primary | ICD-10-CM

## 2025-01-31 PROCEDURE — 99386 PREV VISIT NEW AGE 40-64: CPT | Performed by: FAMILY MEDICINE

## 2025-01-31 PROCEDURE — 1036F TOBACCO NON-USER: CPT | Performed by: FAMILY MEDICINE

## 2025-01-31 PROCEDURE — 3008F BODY MASS INDEX DOCD: CPT | Performed by: FAMILY MEDICINE

## 2025-01-31 RX ORDER — BISMUTH SUBSALICYLATE 262 MG
1 TABLET,CHEWABLE ORAL DAILY
COMMUNITY

## 2025-01-31 SDOH — ECONOMIC STABILITY: INCOME INSECURITY: IN THE LAST 12 MONTHS, WAS THERE A TIME WHEN YOU WERE NOT ABLE TO PAY THE MORTGAGE OR RENT ON TIME?: NO

## 2025-01-31 SDOH — ECONOMIC STABILITY: FOOD INSECURITY: WITHIN THE PAST 12 MONTHS, THE FOOD YOU BOUGHT JUST DIDN'T LAST AND YOU DIDN'T HAVE MONEY TO GET MORE.: NEVER TRUE

## 2025-01-31 SDOH — ECONOMIC STABILITY: TRANSPORTATION INSECURITY
IN THE PAST 12 MONTHS, HAS THE LACK OF TRANSPORTATION KEPT YOU FROM MEDICAL APPOINTMENTS OR FROM GETTING MEDICATIONS?: NO

## 2025-01-31 SDOH — ECONOMIC STABILITY: FOOD INSECURITY: WITHIN THE PAST 12 MONTHS, YOU WORRIED THAT YOUR FOOD WOULD RUN OUT BEFORE YOU GOT MONEY TO BUY MORE.: NEVER TRUE

## 2025-01-31 SDOH — HEALTH STABILITY: PHYSICAL HEALTH: ON AVERAGE, HOW MANY MINUTES DO YOU ENGAGE IN EXERCISE AT THIS LEVEL?: 40 MIN

## 2025-01-31 SDOH — ECONOMIC STABILITY: TRANSPORTATION INSECURITY
IN THE PAST 12 MONTHS, HAS LACK OF TRANSPORTATION KEPT YOU FROM MEETINGS, WORK, OR FROM GETTING THINGS NEEDED FOR DAILY LIVING?: NO

## 2025-01-31 SDOH — HEALTH STABILITY: PHYSICAL HEALTH: ON AVERAGE, HOW MANY DAYS PER WEEK DO YOU ENGAGE IN MODERATE TO STRENUOUS EXERCISE (LIKE A BRISK WALK)?: 2 DAYS

## 2025-01-31 ASSESSMENT — ENCOUNTER SYMPTOMS
PALPITATIONS: 1
VOMITING: 0
COUGH: 0
ENDOCRINE NEGATIVE: 1
WHEEZING: 0
WEAKNESS: 0
EYES NEGATIVE: 1
ALLERGIC/IMMUNOLOGIC NEGATIVE: 1
PSYCHIATRIC NEGATIVE: 1
NUMBNESS: 0
DIZZINESS: 0
ABDOMINAL PAIN: 0
CONSTIPATION: 0
HEMATOLOGIC/LYMPHATIC NEGATIVE: 1
MUSCULOSKELETAL NEGATIVE: 1
FEVER: 0
HEADACHES: 0
SHORTNESS OF BREATH: 0
DIARRHEA: 0

## 2025-01-31 ASSESSMENT — SOCIAL DETERMINANTS OF HEALTH (SDOH)
IN THE PAST 12 MONTHS, HAS THE ELECTRIC, GAS, OIL, OR WATER COMPANY THREATENED TO SHUT OFF SERVICE IN YOUR HOME?: NO
WITHIN THE LAST YEAR, HAVE YOU BEEN HUMILIATED OR EMOTIONALLY ABUSED IN OTHER WAYS BY YOUR PARTNER OR EX-PARTNER?: NO
HOW OFTEN DO YOU ATTENT MEETINGS OF THE CLUB OR ORGANIZATION YOU BELONG TO?: 1 TO 4 TIMES PER YEAR
HOW OFTEN DO YOU ATTEND CHURCH OR RELIGIOUS SERVICES?: NEVER
WITHIN THE LAST YEAR, HAVE YOU BEEN KICKED, HIT, SLAPPED, OR OTHERWISE PHYSICALLY HURT BY YOUR PARTNER OR EX-PARTNER?: NO
WITHIN THE LAST YEAR, HAVE YOU BEEN AFRAID OF YOUR PARTNER OR EX-PARTNER?: NO
HOW HARD IS IT FOR YOU TO PAY FOR THE VERY BASICS LIKE FOOD, HOUSING, MEDICAL CARE, AND HEATING?: NOT HARD AT ALL
IN A TYPICAL WEEK, HOW MANY TIMES DO YOU TALK ON THE PHONE WITH FAMILY, FRIENDS, OR NEIGHBORS?: MORE THAN THREE TIMES A WEEK
HOW OFTEN DO YOU GET TOGETHER WITH FRIENDS OR RELATIVES?: ONCE A WEEK
DO YOU BELONG TO ANY CLUBS OR ORGANIZATIONS SUCH AS CHURCH GROUPS UNIONS, FRATERNAL OR ATHLETIC GROUPS, OR SCHOOL GROUPS?: YES
WITHIN THE LAST YEAR, HAVE TO BEEN RAPED OR FORCED TO HAVE ANY KIND OF SEXUAL ACTIVITY BY YOUR PARTNER OR EX-PARTNER?: NO

## 2025-01-31 ASSESSMENT — LIFESTYLE VARIABLES
HOW OFTEN DO YOU HAVE A DRINK CONTAINING ALCOHOL: NEVER
HOW OFTEN DO YOU HAVE SIX OR MORE DRINKS ON ONE OCCASION: NEVER
HOW MANY STANDARD DRINKS CONTAINING ALCOHOL DO YOU HAVE ON A TYPICAL DAY: PATIENT DOES NOT DRINK
SKIP TO QUESTIONS 9-10: 1
AUDIT-C TOTAL SCORE: 0

## 2025-01-31 ASSESSMENT — PATIENT HEALTH QUESTIONNAIRE - PHQ9
2. FEELING DOWN, DEPRESSED OR HOPELESS: NOT AT ALL
SUM OF ALL RESPONSES TO PHQ9 QUESTIONS 1 & 2: 0
1. LITTLE INTEREST OR PLEASURE IN DOING THINGS: NOT AT ALL

## 2025-01-31 NOTE — PROGRESS NOTES
"Subjective        Ankush Bauer is a 54 y.o. male who presents for Annual Exam.  Patient states a few times over the last few months he found little short episode of some chest discomfort.  Patient thinks it was more in the muscles.  He did not have any other symptoms no radiation, no jaw pain, no dyspnea, patient states occasionally feels like he has palpitations.  He does not get any other symptoms with labs no dizziness, patient has no shortness of breath, normal bowel bladder.  He is seeing urology for his prostate cancer.  Patient states he has had a history of swelling in his lower extremities.  This will improve with compression stockings in the morning.  This also has been going on for some time.  Patient has no orthopnea, no increased pillow use to help him sleep at night.  No recent illness, no night sweats, no bruising,        Review of Systems   Constitutional:  Negative for fever.   HENT: Negative.     Eyes: Negative.    Respiratory:  Negative for cough, shortness of breath and wheezing.    Cardiovascular:  Positive for palpitations and leg swelling. Negative for chest pain.   Gastrointestinal:  Negative for abdominal pain, constipation, diarrhea and vomiting.   Endocrine: Negative.    Genitourinary: Negative.    Musculoskeletal: Negative.    Skin:  Negative for rash.   Allergic/Immunologic: Negative.    Neurological:  Negative for dizziness, weakness, numbness and headaches.   Hematological: Negative.    Psychiatric/Behavioral: Negative.         Objective     /82 (BP Location: Right arm, Patient Position: Sitting, BP Cuff Size: Large adult)   Pulse 76   Temp 36.6 °C (97.9 °F)   Resp 16   Ht 1.886 m (6' 2.25\")   Wt 110 kg (242 lb)   SpO2 96%   BMI 30.86 kg/m²             Physical Exam  Vitals and nursing note reviewed.   Constitutional:       General: He is not in acute distress.     Appearance: Normal appearance. He is not ill-appearing.   HENT:      Head: Normocephalic.      Right Ear: " Tympanic membrane and ear canal normal.      Left Ear: Tympanic membrane and ear canal normal.      Nose: Nose normal.      Mouth/Throat:      Mouth: Mucous membranes are moist.      Pharynx: Oropharynx is clear.   Eyes:      Extraocular Movements: Extraocular movements intact.      Conjunctiva/sclera: Conjunctivae normal.      Pupils: Pupils are equal, round, and reactive to light.   Cardiovascular:      Rate and Rhythm: Normal rate and regular rhythm.      Pulses: Normal pulses.   Pulmonary:      Effort: Pulmonary effort is normal. No respiratory distress.      Breath sounds: No wheezing, rhonchi or rales.   Abdominal:      General: Bowel sounds are normal.      Palpations: Abdomen is soft.      Tenderness: There is no guarding.      Hernia: No hernia is present.   Musculoskeletal:         General: Swelling (1+ swelling bilateral lower extremities.  There are some vascular changes in the feet.  Full range of motion neurovascular intact.) present. Normal range of motion.      Cervical back: Neck supple.      Right lower leg: No edema.      Left lower leg: No edema.   Skin:     General: Skin is warm.      Capillary Refill: Capillary refill takes less than 2 seconds.   Neurological:      General: No focal deficit present.      Mental Status: He is oriented to person, place, and time.      Cranial Nerves: No cranial nerve deficit.      Sensory: No sensory deficit.      Gait: Gait normal.      Deep Tendon Reflexes: Reflexes normal.   Psychiatric:         Mood and Affect: Mood normal.         Behavior: Behavior normal.         Judgment: Judgment normal.         Assessment/Plan   Problem List Items Addressed This Visit    None  Visit Diagnoses       Healthcare maintenance    -  Primary    Relevant Orders    CBC    Comprehensive Metabolic Panel    Lipid Panel    Colonoscopy Screening; Average Risk Patient    CT cardiac scoring wo IV contrast    TSH with reflex to Free T4 if abnormal    Palpitations        Relevant Orders     CT cardiac scoring wo IV contrast    TSH with reflex to Free T4 if abnormal    Referral to Cardiology          Patient will continue to follow-up with urology.  His EKG did show normal sinus rhythm.  Possible right bundle branch block.  Patient is going to see cardiology, get a CT calcium score.  May need an echocardiogram, will get blood work.  Depending on his workup may need to see vascular surgery to check for incompetent veins in the lower extremities  Patient aware if any chest pain shortness of breath increased swelling any chest pain shortness of breath dyspnea worsening symptoms go to ER  Follow-up in 4 to 6 weeks  Patient is also due for his colonoscopy.  Will schedule as

## 2025-02-06 ENCOUNTER — APPOINTMENT (OUTPATIENT)
Dept: CARDIOLOGY | Facility: CLINIC | Age: 55
End: 2025-02-06
Payer: COMMERCIAL

## 2025-02-14 LAB
ALBUMIN SERPL-MCNC: 4.6 G/DL (ref 3.6–5.1)
ALP SERPL-CCNC: 41 U/L (ref 35–144)
ALT SERPL-CCNC: 18 U/L (ref 9–46)
ANION GAP SERPL CALCULATED.4IONS-SCNC: 11 MMOL/L (CALC) (ref 7–17)
AST SERPL-CCNC: 27 U/L (ref 10–35)
BILIRUB SERPL-MCNC: 0.9 MG/DL (ref 0.2–1.2)
BUN SERPL-MCNC: 14 MG/DL (ref 7–25)
CALCIUM SERPL-MCNC: 9.6 MG/DL (ref 8.6–10.3)
CHLORIDE SERPL-SCNC: 103 MMOL/L (ref 98–110)
CHOLEST SERPL-MCNC: 207 MG/DL
CHOLEST/HDLC SERPL: 5.3 (CALC)
CO2 SERPL-SCNC: 28 MMOL/L (ref 20–32)
CREAT SERPL-MCNC: 0.92 MG/DL (ref 0.7–1.3)
EGFRCR SERPLBLD CKD-EPI 2021: 99 ML/MIN/1.73M2
ERYTHROCYTE [DISTWIDTH] IN BLOOD BY AUTOMATED COUNT: 13.6 % (ref 11–15)
GLUCOSE SERPL-MCNC: 79 MG/DL (ref 65–99)
HCT VFR BLD AUTO: 42.1 % (ref 38.5–50)
HDLC SERPL-MCNC: 39 MG/DL
HGB BLD-MCNC: 13.7 G/DL (ref 13.2–17.1)
LDLC SERPL CALC-MCNC: 136 MG/DL (CALC)
MCH RBC QN AUTO: 27.2 PG (ref 27–33)
MCHC RBC AUTO-ENTMCNC: 32.5 G/DL (ref 32–36)
MCV RBC AUTO: 83.7 FL (ref 80–100)
NONHDLC SERPL-MCNC: 168 MG/DL (CALC)
PLATELET # BLD AUTO: 124 THOUSAND/UL (ref 140–400)
PMV BLD REES-ECKER: 11.2 FL (ref 7.5–12.5)
POTASSIUM SERPL-SCNC: 4.8 MMOL/L (ref 3.5–5.3)
PROT SERPL-MCNC: 6.7 G/DL (ref 6.1–8.1)
RBC # BLD AUTO: 5.03 MILLION/UL (ref 4.2–5.8)
SODIUM SERPL-SCNC: 142 MMOL/L (ref 135–146)
TRIGL SERPL-MCNC: 185 MG/DL
TSH SERPL-ACNC: 2.52 MIU/L (ref 0.4–4.5)
WBC # BLD AUTO: 3.6 THOUSAND/UL (ref 3.8–10.8)

## 2025-02-19 ENCOUNTER — TELEPHONE (OUTPATIENT)
Dept: UROLOGY | Facility: CLINIC | Age: 55
End: 2025-02-19
Payer: COMMERCIAL

## 2025-02-19 ENCOUNTER — HOSPITAL ENCOUNTER (OUTPATIENT)
Dept: CARDIOLOGY | Facility: HOSPITAL | Age: 55
Discharge: HOME | End: 2025-02-19
Payer: COMMERCIAL

## 2025-02-19 ENCOUNTER — LAB (OUTPATIENT)
Dept: LAB | Facility: HOSPITAL | Age: 55
End: 2025-02-19
Payer: COMMERCIAL

## 2025-02-19 VITALS — WEIGHT: 243 LBS | BODY MASS INDEX: 30.99 KG/M2

## 2025-02-19 DIAGNOSIS — C61 PROSTATE CANCER (MULTI): ICD-10-CM

## 2025-02-19 LAB
ANION GAP SERPL CALC-SCNC: 10 MMOL/L (ref 10–20)
BUN SERPL-MCNC: 17 MG/DL (ref 6–23)
CALCIUM SERPL-MCNC: 10 MG/DL (ref 8.6–10.3)
CHLORIDE SERPL-SCNC: 106 MMOL/L (ref 98–107)
CO2 SERPL-SCNC: 32 MMOL/L (ref 21–32)
CREAT SERPL-MCNC: 1.15 MG/DL (ref 0.5–1.3)
EGFRCR SERPLBLD CKD-EPI 2021: 75 ML/MIN/1.73M*2
ERYTHROCYTE [DISTWIDTH] IN BLOOD BY AUTOMATED COUNT: 13.2 % (ref 11.5–14.5)
GLUCOSE SERPL-MCNC: 87 MG/DL (ref 74–99)
HCT VFR BLD AUTO: 41.9 % (ref 41–52)
HGB BLD-MCNC: 14.1 G/DL (ref 13.5–17.5)
MCH RBC QN AUTO: 27.2 PG (ref 26–34)
MCHC RBC AUTO-ENTMCNC: 33.7 G/DL (ref 32–36)
MCV RBC AUTO: 81 FL (ref 80–100)
NRBC BLD-RTO: 0 /100 WBCS (ref 0–0)
PLATELET # BLD AUTO: 147 X10*3/UL (ref 150–450)
POTASSIUM SERPL-SCNC: 4.3 MMOL/L (ref 3.5–5.3)
RBC # BLD AUTO: 5.18 X10*6/UL (ref 4.5–5.9)
SODIUM SERPL-SCNC: 144 MMOL/L (ref 136–145)
WBC # BLD AUTO: 4.3 X10*3/UL (ref 4.4–11.3)

## 2025-02-19 PROCEDURE — 80048 BASIC METABOLIC PNL TOTAL CA: CPT

## 2025-02-19 PROCEDURE — 85027 COMPLETE CBC AUTOMATED: CPT

## 2025-02-19 PROCEDURE — 93005 ELECTROCARDIOGRAM TRACING: CPT

## 2025-02-19 PROCEDURE — 36415 COLL VENOUS BLD VENIPUNCTURE: CPT

## 2025-02-19 PROCEDURE — 93010 ELECTROCARDIOGRAM REPORT: CPT | Performed by: INTERNAL MEDICINE

## 2025-02-19 PROCEDURE — 87086 URINE CULTURE/COLONY COUNT: CPT | Mod: ELYLAB

## 2025-02-19 NOTE — TELEPHONE ENCOUNTER
Office received notification from PAT that pt has not had pre-op testing done and they have tried to contact patient numerous times with no response. This office also left messages with pt.  Called and spoke to pt this morning, stated he spoke with PAT yesterday and was told he only needed an EKG. Reiterated instructions that were reviewed with pt during last office visit, advised he needs labs and EKG to be done today to avoid surgery cancellation. Stated he will go today. Phone number for PAT also provided to pt.

## 2025-02-19 NOTE — PREPROCEDURE INSTRUCTIONS

## 2025-02-20 LAB — BACTERIA UR CULT: NO GROWTH

## 2025-02-23 LAB
ATRIAL RATE: 67 BPM
P AXIS: 47 DEGREES
P OFFSET: 172 MS
P ONSET: 110 MS
PR INTERVAL: 188 MS
Q ONSET: 204 MS
QRS COUNT: 11 BEATS
QRS DURATION: 126 MS
QT INTERVAL: 366 MS
QTC CALCULATION(BAZETT): 386 MS
QTC FREDERICIA: 380 MS
R AXIS: -29 DEGREES
T AXIS: 47 DEGREES
T OFFSET: 387 MS
VENTRICULAR RATE: 67 BPM

## 2025-02-25 ENCOUNTER — TELEPHONE (OUTPATIENT)
Dept: UROLOGY | Facility: CLINIC | Age: 55
End: 2025-02-25
Payer: COMMERCIAL

## 2025-02-25 NOTE — TELEPHONE ENCOUNTER
Received notification from PAT of abnormal pre-op EKG. Faxed to PCP for review. Per PCP, pt has been referred to cardiology for evaluation and is not cleared for fusion biopsy at this time. Dr. Schneider and [facility] surgery staff made aware. Spoke with pt, advised fusion biopsy for 2/26/25 will be canceled pending cardiac clearance. Voiced understanding with no questions at this time.

## 2025-03-07 ENCOUNTER — APPOINTMENT (OUTPATIENT)
Dept: PRIMARY CARE | Facility: CLINIC | Age: 55
End: 2025-03-07
Payer: COMMERCIAL

## 2025-03-07 VITALS
WEIGHT: 247.8 LBS | DIASTOLIC BLOOD PRESSURE: 84 MMHG | HEART RATE: 77 BPM | OXYGEN SATURATION: 94 % | HEIGHT: 74 IN | BODY MASS INDEX: 31.8 KG/M2 | SYSTOLIC BLOOD PRESSURE: 148 MMHG | RESPIRATION RATE: 16 BRPM | TEMPERATURE: 98 F

## 2025-03-07 DIAGNOSIS — D72.819 LEUKOPENIA, UNSPECIFIED TYPE: ICD-10-CM

## 2025-03-07 DIAGNOSIS — E78.2 MIXED HYPERLIPIDEMIA: ICD-10-CM

## 2025-03-07 DIAGNOSIS — R00.2 PALPITATIONS: Primary | ICD-10-CM

## 2025-03-07 DIAGNOSIS — C61 PROSTATE CANCER (MULTI): ICD-10-CM

## 2025-03-07 PROCEDURE — 99214 OFFICE O/P EST MOD 30 MIN: CPT | Performed by: FAMILY MEDICINE

## 2025-03-07 PROCEDURE — 3008F BODY MASS INDEX DOCD: CPT | Performed by: FAMILY MEDICINE

## 2025-03-07 PROCEDURE — 1036F TOBACCO NON-USER: CPT | Performed by: FAMILY MEDICINE

## 2025-03-07 ASSESSMENT — PATIENT HEALTH QUESTIONNAIRE - PHQ9
SUM OF ALL RESPONSES TO PHQ9 QUESTIONS 1 & 2: 0
1. LITTLE INTEREST OR PLEASURE IN DOING THINGS: NOT AT ALL
2. FEELING DOWN, DEPRESSED OR HOPELESS: NOT AT ALL

## 2025-03-07 ASSESSMENT — ENCOUNTER SYMPTOMS
RESPIRATORY NEGATIVE: 1
CONSTITUTIONAL NEGATIVE: 1
GASTROINTESTINAL NEGATIVE: 1
HEMATOLOGIC/LYMPHATIC NEGATIVE: 1

## 2025-03-07 NOTE — PROGRESS NOTES
"Subjective   Patient ID: Ankush Bauer is a 55 y.o. male who presents for Follow-up (4 week follow up) and Results (Blood work).    Patient still gets that upper abdomen lower rib cage vague discomfort at different times.  No true chest pain or shortness of breath no dyspnea no leg swelling.  Patient has started to watch his diet and increase his physical activity.  No family history of autoimmune disorders.  Patient states no one to her mention anything about his platelets.  Patient does not drink alcohol.  She does not recall being sick at all before any of his blood work.  Patient does not have any psoriatic type plaques.  No joint pain or swelling.         Review of Systems   Constitutional: Negative.    HENT: Negative.     Respiratory: Negative.     Cardiovascular:  Positive for chest pain.   Gastrointestinal: Negative.    Hematological: Negative.        Objective   /84 (BP Location: Right arm, Patient Position: Sitting, BP Cuff Size: Large adult)   Pulse 77   Temp 36.7 °C (98 °F) (Temporal)   Resp 16   Ht 1.88 m (6' 2\")   Wt 112 kg (247 lb 12.8 oz)   SpO2 94%   BMI 31.82 kg/m²     Physical Exam  Vitals reviewed.   Constitutional:       General: He is not in acute distress.     Appearance: Normal appearance.   HENT:      Head: Normocephalic.      Mouth/Throat:      Pharynx: No oropharyngeal exudate or posterior oropharyngeal erythema.   Eyes:      Conjunctiva/sclera: Conjunctivae normal.   Cardiovascular:      Rate and Rhythm: Normal rate and regular rhythm.      Heart sounds: Normal heart sounds.   Pulmonary:      Effort: Pulmonary effort is normal.      Breath sounds: Normal breath sounds.   Lymphadenopathy:      Cervical: No cervical adenopathy.   Skin:     Findings: No rash.   Neurological:      Mental Status: He is alert.   Psychiatric:         Mood and Affect: Mood normal.         Behavior: Behavior normal.         Assessment/Plan   Problem List Items Addressed This Visit       Prostate " cancer (Multi)     Other Visit Diagnoses       Palpitations    -  Primary    Mixed hyperlipidemia        Leukopenia, unspecified type              Patient will see cardiology just to be sure nothing is going on before his biopsy.  He will get his biopsy if his blood work is still abnormal we will have him see hematology.  Patient does not have any excess bleeding or bruising.  However he starts have any chest pain shortness of breath any nausea vomiting any excess bleeding bruising, any concerning symptoms go to ER  Follow-up in 2 months pending labs and workup    Discussed patient's cardiac risk index score.  He is at 7.7% he should start cholesterol medication.  He wants to see cardiology first

## 2025-03-13 ENCOUNTER — OFFICE VISIT (OUTPATIENT)
Dept: CARDIOLOGY | Facility: CLINIC | Age: 55
End: 2025-03-13
Payer: COMMERCIAL

## 2025-03-13 VITALS
HEART RATE: 76 BPM | WEIGHT: 247 LBS | BODY MASS INDEX: 31.7 KG/M2 | HEIGHT: 74 IN | OXYGEN SATURATION: 97 % | SYSTOLIC BLOOD PRESSURE: 122 MMHG | DIASTOLIC BLOOD PRESSURE: 80 MMHG

## 2025-03-13 DIAGNOSIS — E78.2 MIXED HYPERLIPIDEMIA: ICD-10-CM

## 2025-03-13 DIAGNOSIS — R00.2 PALPITATIONS: ICD-10-CM

## 2025-03-13 DIAGNOSIS — E66.811 CLASS 1 OBESITY DUE TO EXCESS CALORIES WITHOUT SERIOUS COMORBIDITY WITH BODY MASS INDEX (BMI) OF 31.0 TO 31.9 IN ADULT: ICD-10-CM

## 2025-03-13 DIAGNOSIS — I44.4 LAFB (LEFT ANTERIOR FASCICULAR BLOCK): ICD-10-CM

## 2025-03-13 DIAGNOSIS — R31.9 HEMATURIA, UNSPECIFIED TYPE: ICD-10-CM

## 2025-03-13 DIAGNOSIS — E66.09 CLASS 1 OBESITY DUE TO EXCESS CALORIES WITHOUT SERIOUS COMORBIDITY WITH BODY MASS INDEX (BMI) OF 31.0 TO 31.9 IN ADULT: ICD-10-CM

## 2025-03-13 DIAGNOSIS — I45.10 INCOMPLETE RBBB: ICD-10-CM

## 2025-03-13 DIAGNOSIS — R60.9 EDEMA, UNSPECIFIED TYPE: ICD-10-CM

## 2025-03-13 DIAGNOSIS — C61 PROSTATE CANCER (MULTI): Primary | ICD-10-CM

## 2025-03-13 DIAGNOSIS — Z01.818 PRE-OPERATIVE CLEARANCE: ICD-10-CM

## 2025-03-13 DIAGNOSIS — R07.9 CHEST PAIN, UNSPECIFIED TYPE: ICD-10-CM

## 2025-03-13 DIAGNOSIS — H53.002 AMBLYOPIA OF LEFT EYE: ICD-10-CM

## 2025-03-13 DIAGNOSIS — R94.31 ABNORMAL EKG: ICD-10-CM

## 2025-03-13 PROCEDURE — 93000 ELECTROCARDIOGRAM COMPLETE: CPT | Performed by: INTERNAL MEDICINE

## 2025-03-13 PROCEDURE — 99205 OFFICE O/P NEW HI 60 MIN: CPT | Performed by: INTERNAL MEDICINE

## 2025-03-13 PROCEDURE — 3008F BODY MASS INDEX DOCD: CPT | Performed by: INTERNAL MEDICINE

## 2025-03-13 PROCEDURE — 1036F TOBACCO NON-USER: CPT | Performed by: INTERNAL MEDICINE

## 2025-03-13 NOTE — PROGRESS NOTES
CARDIOLOGY CONSULTATION NOTE       Patient:    Ankush Bauer    YOB: 1970  MRN:    [id number]    Date:   3/13/2025    Primary Physician: Dc Mcclain DO       REASON FOR CONSULT / CHIEF COMPLAINT:      Palpitations, abnormal EKG, preoperative cardiac assessment    IMPRESSION:      Preoperative cardiac clearance, planned prostate biopsy  Abnormal resting electrocardiogram  Palpitations  Chest discomfort  Edema  Incomplete right bundle branch block  Left anterior fascicular block  Hyperlipidemia  Hematuria, resolved  Prostate cancer, pending biopsy.  Thrombocytopenia, mild  Amblyopia  Obesity  Otherwise as per assessment below.    RECOMMENDATIONS:      Patient has above-noted history and findings.  Given his abnormal EKG and symptomatology would suggest that we obtain further cardiovascular testing prior to preoperative clearance.    Would suggest the following: CT calcium score, exercise Myoview perfusion stress test, echocardiogram, 7-day Zio cardiac monitor.  Further recommendations were rendered following including possible invasive testing as warranted.    Patient continue current medications at this time.    Exercise dietary program was encouraged.    Hydration.    [employer] portal use was offered.    We will plan to see back following the above testing with Laboratory Studies and ECG as noted.     Patient will follow up with their primary physician for general care.    The patient knows to contact medical care earlier if need be.      HPI:     Ankush Bauer was seen in cardiac evaluation at the  Cardiology office March 13, 2025.      The patients problems are listed as in the impression above.    Electronic medical records reviewed.    Patient is a pleasant 55-year-old hyperlipidemic, obese gentleman with out prior significant cardiovascular or general medical issues whom is referred for preoperative cardiac clearance for planned prostate biopsy.  Preoperative foot clearance had noted  abnormal EKG and patient had admitted to palpitations and edema.  Cardiology was therefore asked to see for preoperative assessment.    Patient states that overall he has been well.  However over the past 5 years he has noted palpitations which have increased lately to once a week.  He feels a fluttering like chest discomfort.  Somewhat increased with activity.  He has noted also for years his left leg swells at times.  He has diet controlled hyperlipidemia on the.  He has no other cardiovascular complaints.    EKG noted as below incomplete right bundle branch block and left anterior fascicular block.  He states that he may have had last stress test 30 years ago which was negative.  He has no other cardiac history or prior testing.    He is currently asymptomatic.    Patient denies SOB, Lightheadedness, Dizziness, TIA or CVA symptoms.  No CHF or Edema.  No GI,  or Bleeding Issues. No Recent Fever or Chills.     Cardiovascular and general review of systems is otherwise negative.    A 14-system review is otherwise negative, other than noted.    ALLERGIES:     Allergies   Allergen Reactions    Pollen Extracts Unknown    Ragweed Unknown        MEDICATIONS:     Current Outpatient Medications   Medication Instructions    multivitamin tablet 1 tablet, Daily    NON FORMULARY 1 each, Daily       PAST MEDICAL HISTORY:   As per impression above.  No other significant past medical or surgical history appreciated.    SOCIAL HISTORY:   .  Children.   works at [employer].  Denies tobacco alcohol or illicit drug use of significance.    FAMILY HISTORY:   Negative family history of CAD    VITALS:     Vitals:    03/13/25 1548   BP: 122/80   Pulse: 76   SpO2: 97%       Wt Readings from Last 4 Encounters:   03/13/25 112 kg (247 lb)   03/07/25 112 kg (247 lb 12.8 oz)   01/31/25 110 kg (242 lb)   12/04/24 115 kg (253 lb)       PHYSICAL EXAMINATION:      General: No acute distress. Vital signs as noted. Alert and  oriented.  Head And Neck Examination: No jugular venous distention, no carotid bruits, no mass. Carotid upstrokes preserved. Oral mucosa moist.  No xanthelasma. Head and neck examination otherwise unremarkable.  Lungs: Clear to auscultation and percussion. No wheezes, no rales,  and no rhonchi.  Chest: Excursion appeared to be normal. No chest wall tenderness on palpation.  Heart: Normal S1 and S2. No S3. No S4. No rub. Grade 1/6 systolic murmur, best heard at the left sternal border. Point of maximal impulse was within normal limits.  Abdomen: Soft. Nontender. No organomegaly. No bruits. No masses. Obese.  Extremities: No bipedal edema. No clubbing. No cyanosis.  Pulses are strong throughout. No bruits.  Musculoskeletal Exam: No ulcers, otherwise unremarkable.  Neuro: Neurologically appeared grossly intact.    ELECTROCARDIOGRAM:      Sinus rhythm, incomplete right bundle branch block, left anterior fascicular block, nonspecific ST wave changes.  Rate 94.    CARDIAC TESTING:      None this visit    LABORATORY DATA:      CBC:   Lab Results   Component Value Date    WBC 4.3 (L) 02/19/2025    WBC 3.6 (L) 02/13/2025    RBC 5.18 02/19/2025    RBC 5.03 02/13/2025    HGB 14.1 02/19/2025    HGB 13.7 02/13/2025    HCT 41.9 02/19/2025    HCT 42.1 02/13/2025     (L) 02/19/2025     (L) 02/13/2025        CMP:    Lab Results   Component Value Date     02/19/2025     02/13/2025    K 4.3 02/19/2025    K 4.8 02/13/2025     02/19/2025     02/13/2025    CO2 32 02/19/2025    CO2 28 02/13/2025    BUN 17 02/19/2025    BUN 14 02/13/2025    CREATININE 1.15 02/19/2025    CREATININE 0.92 02/13/2025    GLUCOSE 87 02/19/2025    GLUCOSE 79 02/13/2025    CALCIUM 10.0 02/19/2025    CALCIUM 9.6 02/13/2025       Magnesium:    Lab Results   Component Value Date    MG 1.99 11/08/2023       Lipid Profile:    Lab Results   Component Value Date    CHOL 207 (H) 02/13/2025    TRIG 185 (H) 02/13/2025    HDL 39 (L)  02/13/2025    LDLCALC 136 (H) 02/13/2025       Hepatic Function Panel:    Lab Results   Component Value Date    ALKPHOS 41 02/13/2025    ALT 18 02/13/2025    AST 27 02/13/2025    PROT 6.7 02/13/2025    BILITOT 0.9 02/13/2025       TSH:    Lab Results   Component Value Date    TSH 2.52 02/13/2025                 PROBLEM LIST:     Patient Active Problem List   Diagnosis    Hematuria    Prostate cancer (Multi)             Brian Reynolds MD, [facility] / NO /  Cardiology      Of Note:  [employer] voice recognition dictation software was utilized partially in the preparation of this note, therefore, inaccuracies in spelling, word choice and punctuation may have occurred which were not recognized at the time of signing.    Patient was seen and examined with total time of visit including chart preparation, rooming, and chart completion exceeding 40 minutes.    ----

## 2025-03-18 ENCOUNTER — APPOINTMENT (OUTPATIENT)
Dept: UROLOGY | Facility: CLINIC | Age: 55
End: 2025-03-18
Payer: COMMERCIAL

## 2025-03-19 ENCOUNTER — APPOINTMENT (OUTPATIENT)
Dept: CARDIOLOGY | Facility: CLINIC | Age: 55
End: 2025-03-19
Payer: COMMERCIAL

## 2025-03-23 ENCOUNTER — HOSPITAL ENCOUNTER (OUTPATIENT)
Dept: RADIOLOGY | Facility: HOSPITAL | Age: 55
Discharge: HOME | End: 2025-03-23
Payer: COMMERCIAL

## 2025-03-23 DIAGNOSIS — Z00.00 HEALTHCARE MAINTENANCE: ICD-10-CM

## 2025-03-23 DIAGNOSIS — R00.2 PALPITATIONS: ICD-10-CM

## 2025-03-23 PROCEDURE — 75571 CT HRT W/O DYE W/CA TEST: CPT

## 2025-03-25 ENCOUNTER — APPOINTMENT (OUTPATIENT)
Dept: UROLOGY | Facility: CLINIC | Age: 55
End: 2025-03-25
Payer: COMMERCIAL

## 2025-04-09 ENCOUNTER — HOSPITAL ENCOUNTER (OUTPATIENT)
Dept: CARDIOLOGY | Facility: HOSPITAL | Age: 55
Discharge: HOME | End: 2025-04-09
Payer: COMMERCIAL

## 2025-04-09 ENCOUNTER — HOSPITAL ENCOUNTER (OUTPATIENT)
Dept: RADIOLOGY | Facility: HOSPITAL | Age: 55
Discharge: HOME | End: 2025-04-09
Payer: COMMERCIAL

## 2025-04-09 DIAGNOSIS — R00.2 PALPITATIONS: ICD-10-CM

## 2025-04-09 DIAGNOSIS — Z01.818 PRE-OPERATIVE CLEARANCE: ICD-10-CM

## 2025-04-09 DIAGNOSIS — C61 PROSTATE CANCER (MULTI): ICD-10-CM

## 2025-04-09 DIAGNOSIS — E78.2 MIXED HYPERLIPIDEMIA: ICD-10-CM

## 2025-04-09 DIAGNOSIS — I45.10 INCOMPLETE RBBB: ICD-10-CM

## 2025-04-09 DIAGNOSIS — I44.4 LAFB (LEFT ANTERIOR FASCICULAR BLOCK): ICD-10-CM

## 2025-04-09 DIAGNOSIS — R31.9 HEMATURIA, UNSPECIFIED TYPE: ICD-10-CM

## 2025-04-09 DIAGNOSIS — E66.811 CLASS 1 OBESITY DUE TO EXCESS CALORIES WITHOUT SERIOUS COMORBIDITY WITH BODY MASS INDEX (BMI) OF 31.0 TO 31.9 IN ADULT: ICD-10-CM

## 2025-04-09 DIAGNOSIS — E66.09 CLASS 1 OBESITY DUE TO EXCESS CALORIES WITHOUT SERIOUS COMORBIDITY WITH BODY MASS INDEX (BMI) OF 31.0 TO 31.9 IN ADULT: ICD-10-CM

## 2025-04-09 DIAGNOSIS — H53.002 AMBLYOPIA OF LEFT EYE: ICD-10-CM

## 2025-04-09 DIAGNOSIS — R94.31 ABNORMAL EKG: ICD-10-CM

## 2025-04-09 DIAGNOSIS — R07.9 CHEST PAIN, UNSPECIFIED TYPE: ICD-10-CM

## 2025-04-09 DIAGNOSIS — R60.9 EDEMA, UNSPECIFIED TYPE: ICD-10-CM

## 2025-04-09 PROCEDURE — 93306 TTE W/DOPPLER COMPLETE: CPT

## 2025-04-09 PROCEDURE — 93017 CV STRESS TEST TRACING ONLY: CPT

## 2025-04-09 PROCEDURE — A9502 TC99M TETROFOSMIN: HCPCS | Performed by: INTERNAL MEDICINE

## 2025-04-09 PROCEDURE — 78452 HT MUSCLE IMAGE SPECT MULT: CPT

## 2025-04-09 PROCEDURE — 3430000001 HC RX 343 DIAGNOSTIC RADIOPHARMACEUTICALS: Performed by: INTERNAL MEDICINE

## 2025-04-09 PROCEDURE — 93018 CV STRESS TEST I&R ONLY: CPT | Performed by: INTERNAL MEDICINE

## 2025-04-09 PROCEDURE — 93016 CV STRESS TEST SUPVJ ONLY: CPT | Performed by: INTERNAL MEDICINE

## 2025-04-09 PROCEDURE — 93306 TTE W/DOPPLER COMPLETE: CPT | Performed by: INTERNAL MEDICINE

## 2025-04-09 RX ADMIN — TETROFOSMIN 10.1 MILLICURIE: 0.23 INJECTION, POWDER, LYOPHILIZED, FOR SOLUTION INTRAVENOUS at 07:45

## 2025-04-09 RX ADMIN — TETROFOSMIN 32 MILLICURIE: 0.23 INJECTION, POWDER, LYOPHILIZED, FOR SOLUTION INTRAVENOUS at 10:15

## 2025-04-11 LAB
AORTIC VALVE PEAK VELOCITY: 1.28 M/S
AV PEAK GRADIENT: 7 MMHG
AVA (PEAK VEL): 3.68 CM2
EJECTION FRACTION APICAL 4 CHAMBER: 66.7
EJECTION FRACTION: 65 %
LEFT VENTRICLE INTERNAL DIMENSION DIASTOLE: 4.8 CM (ref 3.5–6)
LEFT VENTRICULAR OUTFLOW TRACT DIAMETER: 2.4 CM
LV EJECTION FRACTION BIPLANE: 66 %
MITRAL VALVE E/A RATIO: 0.77
MITRAL VALVE E/E' RATIO: 6.8
RIGHT VENTRICLE PEAK SYSTOLIC PRESSURE: 12.1 MMHG
TRICUSPID ANNULAR PLANE SYSTOLIC EXCURSION: 1.9 CM

## 2025-05-21 ENCOUNTER — APPOINTMENT (OUTPATIENT)
Dept: CARDIOLOGY | Facility: CLINIC | Age: 55
End: 2025-05-21
Payer: COMMERCIAL

## 2025-05-21 VITALS
WEIGHT: 251.2 LBS | SYSTOLIC BLOOD PRESSURE: 132 MMHG | HEIGHT: 74 IN | HEART RATE: 85 BPM | DIASTOLIC BLOOD PRESSURE: 80 MMHG | BODY MASS INDEX: 32.24 KG/M2

## 2025-05-21 DIAGNOSIS — E66.811 CLASS 1 OBESITY DUE TO EXCESS CALORIES WITHOUT SERIOUS COMORBIDITY WITH BODY MASS INDEX (BMI) OF 31.0 TO 31.9 IN ADULT: ICD-10-CM

## 2025-05-21 DIAGNOSIS — E78.2 MIXED HYPERLIPIDEMIA: ICD-10-CM

## 2025-05-21 DIAGNOSIS — E66.09 CLASS 1 OBESITY DUE TO EXCESS CALORIES WITHOUT SERIOUS COMORBIDITY WITH BODY MASS INDEX (BMI) OF 31.0 TO 31.9 IN ADULT: ICD-10-CM

## 2025-05-21 DIAGNOSIS — Z71.2 ENCOUNTER TO DISCUSS TEST RESULTS: ICD-10-CM

## 2025-05-21 DIAGNOSIS — R00.2 PALPITATIONS: ICD-10-CM

## 2025-05-21 DIAGNOSIS — R94.31 ABNORMAL EKG: ICD-10-CM

## 2025-05-21 DIAGNOSIS — Z01.818 PRE-OPERATIVE CLEARANCE: ICD-10-CM

## 2025-05-21 DIAGNOSIS — I47.19 PAT (PAROXYSMAL ATRIAL TACHYCARDIA): ICD-10-CM

## 2025-05-21 DIAGNOSIS — I44.4 LAFB (LEFT ANTERIOR FASCICULAR BLOCK): ICD-10-CM

## 2025-05-21 DIAGNOSIS — R60.9 EDEMA, UNSPECIFIED TYPE: ICD-10-CM

## 2025-05-21 DIAGNOSIS — C61 PROSTATE CANCER (MULTI): ICD-10-CM

## 2025-05-21 DIAGNOSIS — I45.10 INCOMPLETE RBBB: ICD-10-CM

## 2025-05-21 DIAGNOSIS — I10 PRIMARY HYPERTENSION: ICD-10-CM

## 2025-05-21 DIAGNOSIS — R07.9 CHEST PAIN, UNSPECIFIED TYPE: Primary | ICD-10-CM

## 2025-05-21 DIAGNOSIS — R94.39 ABNORMAL RESULT OF OTHER CARDIOVASCULAR FUNCTION STUDY: ICD-10-CM

## 2025-05-21 PROCEDURE — 3075F SYST BP GE 130 - 139MM HG: CPT | Performed by: INTERNAL MEDICINE

## 2025-05-21 PROCEDURE — 99214 OFFICE O/P EST MOD 30 MIN: CPT | Performed by: INTERNAL MEDICINE

## 2025-05-21 PROCEDURE — 1036F TOBACCO NON-USER: CPT | Performed by: INTERNAL MEDICINE

## 2025-05-21 PROCEDURE — 3008F BODY MASS INDEX DOCD: CPT | Performed by: INTERNAL MEDICINE

## 2025-05-21 PROCEDURE — 3079F DIAST BP 80-89 MM HG: CPT | Performed by: INTERNAL MEDICINE

## 2025-05-21 RX ORDER — METOPROLOL SUCCINATE 25 MG/1
25 TABLET, EXTENDED RELEASE ORAL DAILY
Qty: 90 TABLET | Refills: 3 | Status: SHIPPED | OUTPATIENT
Start: 2025-05-21 | End: 2026-05-21

## 2025-05-21 NOTE — PATIENT INSTRUCTIONS
6 month follow up appointment  Please have Fasting Labs done 1 week before   OK for surgery   Start taking metoprolol succinate 25mg once daily  Dr. Reynolds would like you to watch your diet, exercise and hydrate at least 48- 64 oz of fluid a day    DID YOU KNOW  We have a pharmacy here in the [facility].  They can fill all prescriptions, not just cardiac medications.  Prescriptions from other pharmacies can easily be transferred to the  pharmacy by the  pharmacist on site.   pharmacies offer FREE HOME DELIVERY on medications to anywhere in [place]. They can sync your medications. Typically prescriptions can be ready in 10 - 15 minutes. If pharmacy is unable to fill your  prescription or if cost is more than your paying now the Pharmacist can easily transfer back to your Pharmacy of choice. Pharmacy phone # [phone].   Please bring all medicines, vitamins, and herbal supplements with you in original bottles to every appointment  Prescriptions will not be filled unless you are compliant with your follow up appointments or have a follow up appointment scheduled as per instruction of your physician. Refills should be requested at the time of your visit.

## 2025-05-21 NOTE — PROGRESS NOTES
CARDIOLOGY OFFICE NOTE     Date:   5/21/2025    Patient:    Ankush Bauer    YOB: 1970    Primary Physician: Dc Mcclain DO         REASON FOR VISIT / CHIEF COMPLAINT:     Audiology follow-up post testing    HPI:     Ankush Bauer was seen in cardiac evaluation at the [facility] Cardiology office May 21, 2025.      The patients problems are listed as in the impression below.    Electronic medical records reviewed.    Patient returns.  Preoperative testing was performed.  He has a pending prostate biopsy in the future.    He has no symptoms overall.  He feels well in general.  He has a history of palpitations, chest pain and edema.  No change in symptomatology.    Patient denies SOB, Lightheadedness, Dizziness, TIA or CVA symptoms.  No CHF.   No GI,  or Bleeding Issues. No Recent Fever or Chills.     Cardiovascular and general review of systems is otherwise negative.    A 14-system review is otherwise negative, other than noted.     PHYSICAL EXAMINATION:      Vitals:    05/21/25 1540   BP: 140/90   Pulse: 85     General: No acute distress. Alert and oriented.  Head And Neck Examination: No jugular venous distention, no carotid bruits, no mass. Carotid upstrokes preserved. Oral mucosa moist.  No xanthelasma. Head and neck examination otherwise unremarkable.  Lungs: Clear to auscultation and percussion. No wheezes, no rales,  and no rhonchi.  Chest: Excursion appeared to be normal. No chest wall tenderness on palpation.  Heart: Normal S1 and S2. No S3. No S4. No rub. Grade 1/6 systolic murmur, best heard at the left sternal border. Point of maximal impulse was within normal limits.  Abdomen: Soft. Nontender. No organomegaly. No bruits. No masses. Obese.  Extremities: No bipedal edema. No clubbing. No cyanosis.  Pulses are strong throughout. No bruits.  Musculoskeletal Exam: No ulcers, otherwise unremarkable.  Neuro: Neurologically appeared grossly intact.  .  IMPRESSION:      Preoperative  cardiac clearance, planned prostate biopsy  Abnormal resting electrocardiogram  Palpitations  Chest discomfort  Edema  Paroxysmal atrial tachycardia, salvos, cardiac monitor, 2025.  Incomplete right bundle branch block  Left anterior fascicular block  Negative exercise Myoview cardiac fusion stress test, LVEF 65%, 2025  Normal LV systolic function, LVEF 65%, echocardiogram, 2025  Negative CT calcium score, score of 0, 2025  Hyperlipidemia  Hematuria, resolved  Prostate cancer, pending biopsy.  Thrombocytopenia, mild  Amblyopia  Obesity  Otherwise as per assessment below.    RECOMMENDATIONS:      Patient overall is doing well.  He was reassured of the above testing.  He was felt to be acceptable cardiovascular risk for his planned surgery.    Will add Toprol-XL 25 mg daily for his hypertension and paroxysmal atrial tachycardia.  He will continue his other medications.  Refills were provided.    Exercise dietary program.    Hydration.    [employer] use was encouraged.    We will plan to see back in 6 months with Laboratory Studies and ECG as ordered.     Patient will follow up with their primary physician for general care.    The patient knows to contact medical care earlier if need be.      ALLERGIES:     Pollen extracts and Ragweed     MEDICATIONS:     Current Outpatient Medications   Medication Instructions    multivitamin tablet 1 tablet, Daily    NON FORMULARY 1 each, Daily       ELECTROCARDIOGRAM:      None this visit    CARDIAC TESTIN-day cardiac monitor, 2025:  Predominant sinus rhythm with normal heart rate variability.  Short salvos of PAT (4 beats maximum)    Negative exercise Myoview cardiac fusion stress test, LVEF 65%, 2025    Normal LV systolic function, LVEF 65%, echocardiogram, 2025    Negative CT calcium score, score of 0, 2025    LABORATORY DATA:      CBC:   Lab Results   Component Value Date    WBC 4.3 (L) 2025    WBC 3.6 (L) 2025    RBC 5.18 2025     RBC 5.03 02/13/2025    HGB 14.1 02/19/2025    HGB 13.7 02/13/2025    HCT 41.9 02/19/2025    HCT 42.1 02/13/2025     (L) 02/19/2025     (L) 02/13/2025        CMP:    Lab Results   Component Value Date     02/19/2025     02/13/2025    K 4.3 02/19/2025    K 4.8 02/13/2025     02/19/2025     02/13/2025    CO2 32 02/19/2025    CO2 28 02/13/2025    BUN 17 02/19/2025    BUN 14 02/13/2025    CREATININE 1.15 02/19/2025    CREATININE 0.92 02/13/2025    GLUCOSE 87 02/19/2025    GLUCOSE 79 02/13/2025    CALCIUM 10.0 02/19/2025    CALCIUM 9.6 02/13/2025       Magnesium:    Lab Results   Component Value Date    MG 1.99 11/08/2023       Lipid Profile:    Lab Results   Component Value Date    CHOL 207 (H) 02/13/2025    TRIG 185 (H) 02/13/2025    HDL 39 (L) 02/13/2025    LDLCALC 136 (H) 02/13/2025       Hepatic Function Panel:    Lab Results   Component Value Date    ALKPHOS 41 02/13/2025    ALT 18 02/13/2025    AST 27 02/13/2025    PROT 6.7 02/13/2025    BILITOT 0.9 02/13/2025       TSH:    Lab Results   Component Value Date    TSH 2.52 02/13/2025                     PROBLEM LIST:     Problem List[1]          Brian Reynolds MD, FACC   [facility] /  Cardiology      Of Note:  [employer] voice recognition dictation software was utilized partially in the preparation of this note, therefore, inaccuracies in spelling, word choice and punctuation may have occurred which were not recognized at the time of signing.    Patient was seen and examined with total time of visit including chart preparation, rooming, and chart completion exceeding 40 minutes.      I, Joanna Jung RN am scribing for, and in the presence of Dr. Brian Reynolds MD, FACC.    I, Dr. Brian Reynolds MD, FACC, personally performed the services described in the documentation as scribed by Joanna Jung RN  in my presence, and confirm it is both accurate and complete.              [1]   Patient Active Problem  List  Diagnosis    Hematuria    Prostate cancer (Multi)    Abnormal EKG    Palpitations    Chest pain    Pre-operative clearance    Mixed hyperlipidemia    Incomplete RBBB    LAFB (left anterior fascicular block)    Amblyopia of left eye    Edema    Class 1 obesity due to excess calories without serious comorbidity with body mass index (BMI) of 31.0 to 31.9 in adult

## 2025-05-22 ENCOUNTER — TELEPHONE (OUTPATIENT)
Dept: CARDIOLOGY | Facility: CLINIC | Age: 55
End: 2025-05-22
Payer: COMMERCIAL

## 2025-05-22 NOTE — TELEPHONE ENCOUNTER
Landen from Urology called the office stating she received a cardiac clearance form for the patient. Landen states Dr. Schneider moved to [place] and is no longer in the area. Need to know what surgeon is doing the surgery and where they are located.     Who is doing the prostate biopsy?    What is fax number?

## 2025-06-25 DIAGNOSIS — N52.9 ERECTILE DYSFUNCTION, UNSPECIFIED ERECTILE DYSFUNCTION TYPE: Primary | ICD-10-CM

## 2025-06-25 RX ORDER — SILDENAFIL 100 MG/1
100 TABLET, FILM COATED ORAL DAILY PRN
Qty: 12 TABLET | Refills: 3 | Status: SHIPPED | OUTPATIENT
Start: 2025-06-25 | End: 2026-06-25

## 2025-06-26 ENCOUNTER — APPOINTMENT (OUTPATIENT)
Dept: CARDIOLOGY | Facility: CLINIC | Age: 55
End: 2025-06-26
Payer: COMMERCIAL

## 2025-11-13 ENCOUNTER — APPOINTMENT (OUTPATIENT)
Dept: CARDIOLOGY | Facility: CLINIC | Age: 55
End: 2025-11-13
Payer: COMMERCIAL

## 2025-11-20 ENCOUNTER — APPOINTMENT (OUTPATIENT)
Dept: CARDIOLOGY | Facility: CLINIC | Age: 55
End: 2025-11-20
Payer: COMMERCIAL

## 2025-11-24 ENCOUNTER — APPOINTMENT (OUTPATIENT)
Dept: CARDIOLOGY | Facility: CLINIC | Age: 55
End: 2025-11-24
Payer: COMMERCIAL